# Patient Record
Sex: FEMALE | Race: WHITE | Employment: FULL TIME | URBAN - METROPOLITAN AREA
[De-identification: names, ages, dates, MRNs, and addresses within clinical notes are randomized per-mention and may not be internally consistent; named-entity substitution may affect disease eponyms.]

---

## 2020-12-07 ENCOUNTER — OFFICE VISIT (OUTPATIENT)
Dept: ORTHOPEDIC SURGERY | Age: 60
End: 2020-12-07
Payer: COMMERCIAL

## 2020-12-07 VITALS — WEIGHT: 168 LBS | BODY MASS INDEX: 29.77 KG/M2 | HEIGHT: 63 IN | TEMPERATURE: 98 F

## 2020-12-07 PROCEDURE — 99203 OFFICE O/P NEW LOW 30 MIN: CPT | Performed by: ORTHOPAEDIC SURGERY

## 2020-12-07 NOTE — PROGRESS NOTES
Neo Marr is a 64 y.o. female, who presents   Chief Complaint   Patient presents with    Shoulder Pain     new patient right shoulder pain. Present for 2 months with no hx of injury. HPI[de-identified] Right shoulder pain is been present for about a 2 months. There is a history of a couple of falls and she seems always go to the right and hit her arm or shoulder. She has had problems which radiate from her shoulder down into her arm and also now up into her neck. She complains of little numbness on occasions in the arm. She also has a little weakness in the arm. She does work at a [employer] . There is been no active treatment of this problem. She does have a history of frozen shoulder on the left side which was treated with physical therapy. Allergies; medications; past medical, surgical, family, and social history; and problem list have been reviewed today and updated as indicated in this encounter - see below following Ortho specifics. Musculoskeletal: Skin condition gross neurovascular function are good in right upper extremity. Bony and soft tissue anatomy are grossly symmetrical right and left. Before meals and glenohumeral joints are stable. Her elbow and wrist joints are stable with good range of motion and no pain. The right shoulder she guards actively to prevent range of motion beyond about 90 degrees elevation and she has very limited internal and external rotation of the shoulder particular with the arm abducted 90 degrees. The rotator cuff elements seem to be intact and strong. There is little crepitus appreciated within her abbreviated range of motion. Neck range of motion was not associated with any paresthesias or any crepitus or feelings of instability. Radiologic Studies: None    ASSESSMENT:  Behzad Chen was seen today for shoulder pain.     Diagnoses and all orders for this visit:    Adhesive capsulitis of right shoulder     Treatment alternatives were reviewed including medical and physical therapies, injections, and surgical options, expected risks benefits and likely outcome of each were discussed in detail, questions asked and answered and understood. We discussed her symptoms as well as physical findings. Her  was present throughout the evaluation. It seems that she has adhesive capsulitis without evidence of other significant pathology. PLAN: We discussed physical therapy which would be a good starting point. We will schedule her for therapy and follow-up in about 3 weeks. Additional options would be injection if that seems appropriate or manipulation under anesthesia if she shows no improvement. There is no problem list on file for this patient. History reviewed. No pertinent past medical history. History reviewed. No pertinent surgical history. No current outpatient medications on file. No current facility-administered medications for this visit.         No Known Allergies    Social History     Socioeconomic History    Marital status:      Spouse name: None    Number of children: None    Years of education: None    Highest education level: None   Occupational History    None   Social Needs    Financial resource strain: None    Food insecurity     Worry: None     Inability: None    Transportation needs     Medical: None     Non-medical: None   Tobacco Use    Smoking status: None   Substance and Sexual Activity    Alcohol use: None    Drug use: None    Sexual activity: None   Lifestyle    Physical activity     Days per week: None     Minutes per session: None    Stress: None   Relationships    Social connections     Talks on phone: None     Gets together: None     Attends Sabianism service: None     Active member of club or organization: None     Attends meetings of clubs or organizations: None     Relationship status: None    Intimate partner violence     Fear of current or ex partner: None     Emotionally abused: None     Physically abused: None     Forced sexual activity: None   Other Topics Concern    None   Social History Narrative    None       History reviewed. No pertinent family history. Review of Systems:   As follows except as previously noted in HPI:  Constitutional: Negative for chills, diaphoresis,  fever   Respiratory: Negative for cough, shortness of breath and wheezing. Cardiovascular: Negative for chest pain and palpitations. Neurological: Negative for dizziness, syncope,   GI / : abdominal pain or cramping  Musculoskeletal: see HPI       Objective:   Physical Exam   Constitutional: Oriented to person, place, and time. and appears well-developed and well-nourished. :   Head: Normocephalic and atraumatic. Neck: Neck supple. Eyes: EOM are normal.   Pulmonary/Chest: Effort normal.  No respiratory distress, no wheezes. Neurological: Alert and oriented to person  Skin: Skin is warm and dry. Sveta Velarde DO    12/7/20  1:31 PM    All reasonable efforts have been made to minimize the risk of errors that may occur in the use of voice recognition and other electronic means of charting.